# Patient Record
Sex: FEMALE | Race: BLACK OR AFRICAN AMERICAN
[De-identification: names, ages, dates, MRNs, and addresses within clinical notes are randomized per-mention and may not be internally consistent; named-entity substitution may affect disease eponyms.]

---

## 2018-03-01 ENCOUNTER — HOSPITAL ENCOUNTER (EMERGENCY)
Dept: HOSPITAL 17 - PHED | Age: 63
Discharge: HOME | End: 2018-03-01
Payer: OTHER GOVERNMENT

## 2018-03-01 VITALS — BODY MASS INDEX: 33.99 KG/M2 | HEIGHT: 64 IN | WEIGHT: 199.08 LBS

## 2018-03-01 VITALS
DIASTOLIC BLOOD PRESSURE: 88 MMHG | HEART RATE: 68 BPM | SYSTOLIC BLOOD PRESSURE: 188 MMHG | OXYGEN SATURATION: 99 % | RESPIRATION RATE: 16 BRPM | TEMPERATURE: 98.6 F

## 2018-03-01 DIAGNOSIS — T78.40XA: Primary | ICD-10-CM

## 2018-03-01 DIAGNOSIS — E11.9: ICD-10-CM

## 2018-03-01 DIAGNOSIS — I10: ICD-10-CM

## 2018-03-01 DIAGNOSIS — E78.00: ICD-10-CM
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
## 2018-03-01 PROCEDURE — 99283 EMERGENCY DEPT VISIT LOW MDM: CPT

## 2018-03-01 NOTE — PD
HPI


Chief Complaint:  Skin Problem


Time Seen by Provider:  08:03


Travel History


International Travel<30 days:  No


Contact w/Intl Traveler<30days:  No


Traveled to known affect area:  No





History of Present Illness


HPI


This is a 62-year-old female who presents for pruritic rash.  She states that 2 

weeks ago, she switched from Splenda to Stevia.  Around then, she developed a 

diffuse urticarial rash.  It is pruritic in nature.  Onset gradual.  She has 

tried using calamine lotion and has tried Benadryl with some relief.  No lip, 

tongue, throat swelling.  No difficult breathing.  She has been otherwise well 

without nausea, vomiting, diarrhea, abdominal pain.  No rash between fingers or 

toes.  No known exposures to scabies.  Symptoms are mild in severity.  Onset 

gradual.  She states that she did not have any Stevia today.  She also states 

that her diabetes is very well controlled and she only has to take medications 

intermittently.  Her glucose yesterday was 120.





PFSH


Past Medical History


Cardiovascular Problems:  Yes (htn on meds)


High Cholesterol:  Yes


Diabetes:  Yes (type 2)


Patient Takes Glucophage:  No


Diminished Hearing:  No


Hypertension:  Yes


Tetanus Vaccination:  Unknown


Pregnant?:  Not Pregnant





Social History


Alcohol Use:  No


Tobacco Use:  No


Substance Use:  No





Allergies-Medications


(Allergen,Severity, Reaction):  


Coded Allergies:  


     No Known Allergies (Unverified , 3/1/18)


Reported Meds & Prescriptions





Reported Meds & Active Scripts


Active








Review of Systems


Except as stated in HPI:  all other systems reviewed are Neg





Physical Exam


Narrative


GENERAL: Alert, well nourished, well appearing patient resting on the bed in no 

acute distress.  Vital Signs reviewed


SKIN: Focused skin assessment warm/dry.  There are a few scattered urticaria.  

No definite burrows noted.  No petechiae or purpura.  No areas of secondary 

infection noted.  No rash noted between fingers or toes.


HEAD: Atraumatic. Normocephalic. 


EYES: Pupils equal and round. No scleral icterus. No injection or drainage. 


ENT: No nasal bleeding or discharge.  Mucous membranes pink and moist.  No lip, 

tongue, uvular edema.


NECK: Trachea midline. No JVD. Spontaneous, painless full range of motion with 

no meningismus


CARDIOVASCULAR: Regular rate and rhythm.  No murmur appreciated.  Extremities 

warm and well perfused with bounding peripheral pulses


RESPIRATORY: No accessory muscle use. Clear to auscultation. Breath sounds 

equal bilaterally. Breathing easily and speaking in full sentences


GASTROINTESTINAL: Abdomen soft, non-tender, nondistended. Normal bowel sounds.  

No rigid, rebound, guarding


MUSCULOSKELETAL: No obvious deformities. No clubbing.  No cyanosis.  No edema. 

Compartments are soft


NEUROLOGICAL: Awake and alert. No obvious cranial nerve deficits.  Motor 

grossly within normal limits. Normal speech.  Sensation intact. Normal gait





Data


Data


Last Documented VS





Vital Signs








  Date Time  Temp Pulse Resp B/P (MAP) Pulse Ox O2 Delivery O2 Flow Rate FiO2


 


3/1/18 07:58 98.6 68 16 188/88 (121) 99   








Orders





 Orders


Hydroxyzine Hcl (Atarax) (3/1/18 08:15)








[facility]


Medical Decision Making


Medical Screen Exam Complete:  Yes


Emergency Medical Condition:  Yes


Medical Record Reviewed:  Yes


Differential Diagnosis


Allergic reaction, no evidence of anaphylaxis, contact dermatitis, scabies less 

likely


Narrative Course


The patient appears very well.  She has no evidence of anaphylaxis or lip, 

tongue, uvular edema.  I counseled her to stop using this Stevia.  Plan for 

treatment with a short course of prednisone, Pepcid, hydroxyzine for itching.  

Patient will monitor her glucose carefully while taking the steroids.  She 

understands she needs to follow-up with her primary physician within 1-4 days 

for close recheck.  Patient understands the importance of close outpatient 

follow-up.  She understands she may require further testing and treatment as an 

outpatient.  She understands strict return indications.  She is comfortable 

with this plan and eager to go home.





Diagnosis





 Primary Impression:  


 Allergic reaction


 Qualified Codes:  T78.40XA - Allergy, unspecified, initial encounter


Referrals:  


Primary Care Physician


1 day


Patient Instructions:  General Allergic Reaction (ED), General Instructions





***Additional Instructions:  


Use Pepcid and prednisone as directed.  Take hydroxyzine as needed for itching.

  Do not take with Benadryl.  It is important that you follow-up with your 

primary physician within 1-4 days for recheck.  Call today to make an 

appointment.  Do not use Stevia.


***Med/Other Pt SpecificInfo:  Prescription(s) given


Scripts


Hydroxyzine HCl (Hydroxyzine HCl) 25 Mg Tab


25 MG PO TID Y for ITCHING for 5 Days, TAB 0 Refills


   Prov: Marie Chan MD         3/1/18 


Famotidine (Pepcid) 40 Mg Tab


40 MG PO DAILY for 5 Days, #5 TAB 0 Refills


   Prov: aMrie Chan MD         3/1/18 


Prednisone (Prednisone) 20 Mg Tab


40 MG PO DAILY for 5 Days, #10 TAB 0 Refills


   Take 40 mg (2 tablets) daily for 5 days


   Prov: Marie Chan MD         3/1/18


Disposition:  01 DISCHARGE HOME


Condition:  Stable











Marie Chan MD Mar 1, 2018 08:18